# Patient Record
Sex: MALE | Race: OTHER | HISPANIC OR LATINO | URBAN - METROPOLITAN AREA
[De-identification: names, ages, dates, MRNs, and addresses within clinical notes are randomized per-mention and may not be internally consistent; named-entity substitution may affect disease eponyms.]

---

## 2024-08-09 ENCOUNTER — OUTPATIENT (OUTPATIENT)
Dept: OUTPATIENT SERVICES | Facility: HOSPITAL | Age: 24
LOS: 1 days | End: 2024-08-09
Payer: MEDICAID

## 2024-08-09 VITALS
SYSTOLIC BLOOD PRESSURE: 105 MMHG | TEMPERATURE: 98 F | RESPIRATION RATE: 16 BRPM | OXYGEN SATURATION: 98 % | WEIGHT: 214.95 LBS | HEART RATE: 67 BPM | HEIGHT: 67 IN | DIASTOLIC BLOOD PRESSURE: 66 MMHG

## 2024-08-09 DIAGNOSIS — S83.519A SPRAIN OF ANTERIOR CRUCIATE LIGAMENT OF UNSPECIFIED KNEE, INITIAL ENCOUNTER: ICD-10-CM

## 2024-08-09 DIAGNOSIS — S83.241A OTHER TEAR OF MEDIAL MENISCUS, CURRENT INJURY, RIGHT KNEE, INITIAL ENCOUNTER: ICD-10-CM

## 2024-08-09 DIAGNOSIS — M25.561 PAIN IN RIGHT KNEE: ICD-10-CM

## 2024-08-09 DIAGNOSIS — Z01.818 ENCOUNTER FOR OTHER PREPROCEDURAL EXAMINATION: ICD-10-CM

## 2024-08-09 DIAGNOSIS — S83.511A SPRAIN OF ANTERIOR CRUCIATE LIGAMENT OF RIGHT KNEE, INITIAL ENCOUNTER: ICD-10-CM

## 2024-08-09 LAB
ALBUMIN SERPL ELPH-MCNC: 3.8 G/DL — SIGNIFICANT CHANGE UP (ref 3.5–5)
ALP SERPL-CCNC: 68 U/L — SIGNIFICANT CHANGE UP (ref 40–120)
ALT FLD-CCNC: 38 U/L DA — SIGNIFICANT CHANGE UP (ref 10–60)
ANION GAP SERPL CALC-SCNC: 6 MMOL/L — SIGNIFICANT CHANGE UP (ref 5–17)
APTT BLD: 37.9 SEC — HIGH (ref 24.5–35.6)
AST SERPL-CCNC: 21 U/L — SIGNIFICANT CHANGE UP (ref 10–40)
BILIRUB SERPL-MCNC: 0.4 MG/DL — SIGNIFICANT CHANGE UP (ref 0.2–1.2)
BUN SERPL-MCNC: 19 MG/DL — HIGH (ref 7–18)
CALCIUM SERPL-MCNC: 9.2 MG/DL — SIGNIFICANT CHANGE UP (ref 8.4–10.5)
CHLORIDE SERPL-SCNC: 111 MMOL/L — HIGH (ref 96–108)
CO2 SERPL-SCNC: 23 MMOL/L — SIGNIFICANT CHANGE UP (ref 22–31)
CREAT SERPL-MCNC: 1.01 MG/DL — SIGNIFICANT CHANGE UP (ref 0.5–1.3)
EGFR: 107 ML/MIN/1.73M2 — SIGNIFICANT CHANGE UP
GLUCOSE SERPL-MCNC: 88 MG/DL — SIGNIFICANT CHANGE UP (ref 70–99)
HCT VFR BLD CALC: 45.9 % — SIGNIFICANT CHANGE UP (ref 39–50)
HGB BLD-MCNC: 15.2 G/DL — SIGNIFICANT CHANGE UP (ref 13–17)
INR BLD: 0.97 RATIO — SIGNIFICANT CHANGE UP (ref 0.85–1.18)
MCHC RBC-ENTMCNC: 29.1 PG — SIGNIFICANT CHANGE UP (ref 27–34)
MCHC RBC-ENTMCNC: 33.1 GM/DL — SIGNIFICANT CHANGE UP (ref 32–36)
MCV RBC AUTO: 87.8 FL — SIGNIFICANT CHANGE UP (ref 80–100)
NRBC # BLD: 0 /100 WBCS — SIGNIFICANT CHANGE UP (ref 0–0)
PLATELET # BLD AUTO: 309 K/UL — SIGNIFICANT CHANGE UP (ref 150–400)
POTASSIUM SERPL-MCNC: 4.2 MMOL/L — SIGNIFICANT CHANGE UP (ref 3.5–5.3)
POTASSIUM SERPL-SCNC: 4.2 MMOL/L — SIGNIFICANT CHANGE UP (ref 3.5–5.3)
PROT SERPL-MCNC: 7.8 G/DL — SIGNIFICANT CHANGE UP (ref 6–8.3)
PROTHROM AB SERPL-ACNC: 11.1 SEC — SIGNIFICANT CHANGE UP (ref 9.5–13)
RBC # BLD: 5.23 M/UL — SIGNIFICANT CHANGE UP (ref 4.2–5.8)
RBC # FLD: 13.2 % — SIGNIFICANT CHANGE UP (ref 10.3–14.5)
SODIUM SERPL-SCNC: 140 MMOL/L — SIGNIFICANT CHANGE UP (ref 135–145)
WBC # BLD: 6.71 K/UL — SIGNIFICANT CHANGE UP (ref 3.8–10.5)
WBC # FLD AUTO: 6.71 K/UL — SIGNIFICANT CHANGE UP (ref 3.8–10.5)

## 2024-08-09 RX ORDER — ACETAMINOPHEN 325 MG/1
975 TABLET ORAL ONCE
Refills: 0 | Status: COMPLETED | OUTPATIENT
Start: 2024-08-22 | End: 2024-08-22

## 2024-08-09 RX ORDER — SODIUM CHLORIDE 9 MG/ML
3 INJECTION INTRAMUSCULAR; INTRAVENOUS; SUBCUTANEOUS EVERY 8 HOURS
Refills: 0 | Status: DISCONTINUED | OUTPATIENT
Start: 2024-08-22 | End: 2024-08-23

## 2024-08-09 RX ORDER — CELECOXIB 400 MG/1
200 CAPSULE ORAL ONCE
Refills: 0 | Status: COMPLETED | OUTPATIENT
Start: 2024-08-22 | End: 2024-08-22

## 2024-08-09 NOTE — H&P PST ADULT - MUSCULOSKELETAL COMMENTS
Right Knee Arthroscopy with Anterior Cruciate Ligament Reconstruction and Quadriceps Tendon Autograft Possible Medial Meniscus Repair on 8/22/24 with Dr Mota

## 2024-08-09 NOTE — H&P PST ADULT - HISTORY OF PRESENT ILLNESS
24 y.o male with No PMHx, c/o of worsening Right knee pain, on w/u found to have Sprain anterior Cruciate Ligament Right Knee and now for schedule Right Knee Arthroscopy with Anterior Cruciate Ligament Reconstruction and Quadriceps Tendon Autograft Possible Medial Meniscus Repair on 8/22/24 with Dr Mota

## 2024-08-09 NOTE — H&P PST ADULT - REASON FOR ADMISSION
I ve been having pain for 2 yrs and didn't go to the doctor ,now I have an ACl injury and need surgery

## 2024-08-09 NOTE — H&P PST ADULT - MUSCULOSKELETAL
details… decreased ROM due to pain/normal gait/strength 5/5 bilateral upper extremities/strength 5/5 bilateral lower extremities

## 2024-08-09 NOTE — H&P PST ADULT - NSANTHOSAYNRD_GEN_A_CORE
No. NADIYA screening performed.  STOP BANG Legend: 0-2 = LOW Risk; 3-4 = INTERMEDIATE Risk; 5-8 = HIGH Risk

## 2024-08-09 NOTE — H&P PST ADULT - PROBLEM SELECTOR PLAN 1
Pt schedule for Right Knee Arthroscopy with Anterior Cruciate Ligament Reconstruction and Quadriceps Tendon Autograft Possible Medial Meniscus Repair on 8/22/24 with Dr Mota    Labs drawn by PST - will f/u result    Pt was  instructed to stop aspirin/ecotrin and all over the counter medication including vitamins and herbal supplements one week prior to surgery   Instructions given on the use of 4% chlorhexidine wash and Pt verbalized understanding of same   Pt Instructed to have nothing by mouth starting midnight day before surgery  Patient is to expect a phone call day before surgery between the hours of 430- 630pm giving arrival time for surgery   Written and verbal preoperative instructions given to patient with understanding verbalized.     Patient today with STOP bang score 2  Low  risk for NADIYA

## 2024-08-09 NOTE — H&P PST ADULT - ASSESSMENT
24 y.o male with Sprain anterior Cruciate Ligament Right Knee and now for schedule Right Knee Arthroscopy with Anterior Cruciate Ligament Reconstruction and Quadriceps Tendon Autograft Possible Medial Meniscus Repair on 8/22/24 with Dr Nakul YUSUF 2

## 2024-08-12 PROCEDURE — 85610 PROTHROMBIN TIME: CPT

## 2024-08-12 PROCEDURE — 85730 THROMBOPLASTIN TIME PARTIAL: CPT

## 2024-08-12 PROCEDURE — 36415 COLL VENOUS BLD VENIPUNCTURE: CPT

## 2024-08-12 PROCEDURE — 85027 COMPLETE CBC AUTOMATED: CPT

## 2024-08-12 PROCEDURE — G0463: CPT

## 2024-08-12 PROCEDURE — 80053 COMPREHEN METABOLIC PANEL: CPT

## 2024-08-22 ENCOUNTER — INPATIENT (INPATIENT)
Facility: HOSPITAL | Age: 24
LOS: 0 days | Discharge: HOME HEALTH | DRG: 556 | End: 2024-08-23
Attending: ORTHOPAEDIC SURGERY | Admitting: ORTHOPAEDIC SURGERY
Payer: MEDICAID

## 2024-08-22 VITALS
DIASTOLIC BLOOD PRESSURE: 68 MMHG | HEIGHT: 67 IN | SYSTOLIC BLOOD PRESSURE: 135 MMHG | RESPIRATION RATE: 16 BRPM | TEMPERATURE: 98 F | OXYGEN SATURATION: 98 % | WEIGHT: 214.95 LBS | HEART RATE: 59 BPM

## 2024-08-22 DIAGNOSIS — S83.511A SPRAIN OF ANTERIOR CRUCIATE LIGAMENT OF RIGHT KNEE, INITIAL ENCOUNTER: ICD-10-CM

## 2024-08-22 DIAGNOSIS — M25.561 PAIN IN RIGHT KNEE: ICD-10-CM

## 2024-08-22 DIAGNOSIS — S83.241A OTHER TEAR OF MEDIAL MENISCUS, CURRENT INJURY, RIGHT KNEE, INITIAL ENCOUNTER: ICD-10-CM

## 2024-08-22 DIAGNOSIS — Z01.818 ENCOUNTER FOR OTHER PREPROCEDURAL EXAMINATION: ICD-10-CM

## 2024-08-22 DEVICE — IMP FIBERTAG TGHTROPE W/ FLIPCUTTER III AND FIBER SNARE: Type: IMPLANTABLE DEVICE | Site: RIGHT | Status: FUNCTIONAL

## 2024-08-22 DEVICE — IMP ABS TIGHROPE ACL W/FIBERTAG: Type: IMPLANTABLE DEVICE | Site: RIGHT | Status: FUNCTIONAL

## 2024-08-22 DEVICE — IMP TIGHTROPE ABS BUTTON 8X12MM: Type: IMPLANTABLE DEVICE | Site: RIGHT | Status: FUNCTIONAL

## 2024-08-22 DEVICE — IMPLANTABLE DEVICE: Type: IMPLANTABLE DEVICE | Site: RIGHT | Status: FUNCTIONAL

## 2024-08-22 RX ORDER — KETOROLAC TROMETHAMINE 30 MG/ML
30 INJECTION, SOLUTION INTRAMUSCULAR EVERY 6 HOURS
Refills: 0 | Status: DISCONTINUED | OUTPATIENT
Start: 2024-08-22 | End: 2024-08-23

## 2024-08-22 RX ORDER — HYDROMORPHONE HYDROCHLORIDE 2 MG/1
0.5 TABLET ORAL
Refills: 0 | Status: DISCONTINUED | OUTPATIENT
Start: 2024-08-22 | End: 2024-08-22

## 2024-08-22 RX ORDER — ONDANSETRON 2 MG/ML
4 INJECTION, SOLUTION INTRAMUSCULAR; INTRAVENOUS EVERY 6 HOURS
Refills: 0 | Status: DISCONTINUED | OUTPATIENT
Start: 2024-08-22 | End: 2024-08-23

## 2024-08-22 RX ORDER — OXYCODONE AND ACETAMINOPHEN 7.5; 325 MG/1; MG/1
1 TABLET ORAL
Qty: 0 | Refills: 0 | DISCHARGE

## 2024-08-22 RX ORDER — ACETAMINOPHEN 325 MG/1
650 TABLET ORAL EVERY 6 HOURS
Refills: 0 | Status: DISCONTINUED | OUTPATIENT
Start: 2024-08-22 | End: 2024-08-23

## 2024-08-22 RX ORDER — ONDANSETRON 2 MG/ML
1 INJECTION, SOLUTION INTRAMUSCULAR; INTRAVENOUS
Qty: 0 | Refills: 0 | DISCHARGE

## 2024-08-22 RX ORDER — OXYCODONE HYDROCHLORIDE 5 MG/1
5 TABLET ORAL EVERY 6 HOURS
Refills: 0 | Status: DISCONTINUED | OUTPATIENT
Start: 2024-08-22 | End: 2024-08-23

## 2024-08-22 RX ORDER — SODIUM CHLORIDE 9 MG/ML
1000 INJECTION INTRAMUSCULAR; INTRAVENOUS; SUBCUTANEOUS
Refills: 0 | Status: DISCONTINUED | OUTPATIENT
Start: 2024-08-22 | End: 2024-08-23

## 2024-08-22 RX ORDER — ASPIRIN 81 MG
1 TABLET, DELAYED RELEASE (ENTERIC COATED) ORAL
Qty: 0 | Refills: 0 | DISCHARGE

## 2024-08-22 RX ADMIN — HYDROMORPHONE HYDROCHLORIDE 0.5 MILLIGRAM(S): 2 TABLET ORAL at 18:57

## 2024-08-22 RX ADMIN — ACETAMINOPHEN 975 MILLIGRAM(S): 325 TABLET ORAL at 10:20

## 2024-08-22 RX ADMIN — HYDROMORPHONE HYDROCHLORIDE 0.5 MILLIGRAM(S): 2 TABLET ORAL at 19:26

## 2024-08-22 RX ADMIN — HYDROMORPHONE HYDROCHLORIDE 0.5 MILLIGRAM(S): 2 TABLET ORAL at 19:09

## 2024-08-22 RX ADMIN — HYDROMORPHONE HYDROCHLORIDE 0.5 MILLIGRAM(S): 2 TABLET ORAL at 18:20

## 2024-08-22 RX ADMIN — SODIUM CHLORIDE 3 MILLILITER(S): 9 INJECTION INTRAMUSCULAR; INTRAVENOUS; SUBCUTANEOUS at 21:01

## 2024-08-22 RX ADMIN — CELECOXIB 200 MILLIGRAM(S): 400 CAPSULE ORAL at 10:21

## 2024-08-22 RX ADMIN — SODIUM CHLORIDE 140 MILLILITER(S): 9 INJECTION INTRAMUSCULAR; INTRAVENOUS; SUBCUTANEOUS at 21:02

## 2024-08-22 RX ADMIN — KETOROLAC TROMETHAMINE 30 MILLIGRAM(S): 30 INJECTION, SOLUTION INTRAMUSCULAR at 18:42

## 2024-08-22 RX ADMIN — ONDANSETRON 4 MILLIGRAM(S): 2 INJECTION, SOLUTION INTRAMUSCULAR; INTRAVENOUS at 23:31

## 2024-08-22 RX ADMIN — KETOROLAC TROMETHAMINE 30 MILLIGRAM(S): 30 INJECTION, SOLUTION INTRAMUSCULAR at 18:58

## 2024-08-22 NOTE — PROVIDER CONTACT NOTE (OTHER) - ASSESSMENT
pt able to mobilize toes and feel full sensation at RLE, palpable DP +2, cap refill adequate, pain 6/10 but reports he cannot tolerate the pain. Pt also still drowsy from anesthesia / pacu pain meds

## 2024-08-22 NOTE — ASU DISCHARGE PLAN (ADULT/PEDIATRIC) - NS MD DC FALL RISK RISK
For information on Fall & Injury Prevention, visit: [email or web address] OR  [email or web address] OR  https://www.cdc.gov/steadi/patient.html

## 2024-08-22 NOTE — ASU DISCHARGE PLAN (ADULT/PEDIATRIC) - CARE PROVIDER_API CALL
Clint Mota Mattoon  Orthopaedic Surgery  [address] [place] [id number]  Phone: [phone]  Fax: [phone]  Follow Up Time: 1 week

## 2024-08-22 NOTE — BRIEF OPERATIVE NOTE - NSICDXBRIEFPOSTOP_GEN_ALL_CORE_FT
POST-OP DIAGNOSIS:  Rupture of anterior cruciate ligament of right knee 22-Aug-2024 18:17:13  Lucas Stallings

## 2024-08-22 NOTE — ASU DISCHARGE PLAN (ADULT/PEDIATRIC) - ASU DC SPECIAL INSTRUCTIONSFT
keep brace on at all times  do not try to bend the knee  keep dressing clean and dry.   do not get wet.

## 2024-08-22 NOTE — BRIEF OPERATIVE NOTE - NSICDXBRIEFPREOP_GEN_ALL_CORE_FT
PRE-OP DIAGNOSIS:  Rupture of anterior cruciate ligament of right knee 22-Aug-2024 18:17:10  Lucas Stallings

## 2024-08-22 NOTE — PROVIDER CONTACT NOTE (OTHER) - SITUATION
s/p knee arthroscopy ACL Repair under [place] and right adductor canal block in OR, Physical Therapist cannot see patient after 1915 (Monalisa PT) and the patient states he is not ready to sit up.

## 2024-08-22 NOTE — BRIEF OPERATIVE NOTE - NSICDXBRIEFPROCEDURE_GEN_ALL_CORE_FT
PROCEDURES:  Arthroscopy of right knee with repair of anterior cruciate ligament using autograft 22-Aug-2024 18:16:48  Lucas Stallings

## 2024-08-22 NOTE — PACU DISCHARGE NOTE - THE ANESTHESIA ORDERS USED IN THE PACU ORDER SET WILL BE DISCONTINUED UPON TRANSFER OF THIS PATIENT
Statement Selected

## 2024-08-22 NOTE — PROVIDER CONTACT NOTE (OTHER) - RECOMMENDATIONS
MD Stallings spoke to MD Segovia. Patient unable to tolerate PT consult at this time, PT Francisco also agrees and spoke to MD Mota. Pt admitted for pain management and will see PT in the morning.

## 2024-08-22 NOTE — PATIENT PROFILE ADULT - FALL HARM RISK - UNIVERSAL INTERVENTIONS
Bed in lowest position, wheels locked, appropriate side rails in place/Call bell, personal items and telephone in reach/Instruct patient to call for assistance before getting out of bed or chair/Non-slip footwear when patient is out of bed/Rex to call system/Physically safe environment - no spills, clutter or unnecessary equipment/Purposeful Proactive Rounding/Room/bathroom lighting operational, light cord in reach

## 2024-08-22 NOTE — PATIENT PROFILE ADULT - FUNCTIONAL ASSESSMENT - BASIC MOBILITY SECTION LABEL
. [Arthralgia] : arthralgia [Joint Pain] : joint pain [Joint Stiffness] : joint stiffness [Joint Swelling] : joint swelling [Negative] : Heme/Lymph

## 2024-08-22 NOTE — PACU DISCHARGE NOTE - COMMENTS
Clear for discharge from pacu. No anesthetic complications Clear for discharge from pacu. No anesthetic complications  on NC 2LPM

## 2024-08-23 VITALS — HEART RATE: 85 BPM | DIASTOLIC BLOOD PRESSURE: 69 MMHG | SYSTOLIC BLOOD PRESSURE: 127 MMHG

## 2024-08-23 DIAGNOSIS — S83.511A SPRAIN OF ANTERIOR CRUCIATE LIGAMENT OF RIGHT KNEE, INITIAL ENCOUNTER: ICD-10-CM

## 2024-08-23 LAB
ANION GAP SERPL CALC-SCNC: 9 MMOL/L — SIGNIFICANT CHANGE UP (ref 5–17)
BASOPHILS # BLD AUTO: 0.01 K/UL — SIGNIFICANT CHANGE UP (ref 0–0.2)
BASOPHILS NFR BLD AUTO: 0.1 % — SIGNIFICANT CHANGE UP (ref 0–2)
BUN SERPL-MCNC: 12 MG/DL — SIGNIFICANT CHANGE UP (ref 7–18)
CALCIUM SERPL-MCNC: 8.7 MG/DL — SIGNIFICANT CHANGE UP (ref 8.4–10.5)
CHLORIDE SERPL-SCNC: 110 MMOL/L — HIGH (ref 96–108)
CO2 SERPL-SCNC: 24 MMOL/L — SIGNIFICANT CHANGE UP (ref 22–31)
CREAT SERPL-MCNC: 1.05 MG/DL — SIGNIFICANT CHANGE UP (ref 0.5–1.3)
EGFR: 102 ML/MIN/1.73M2 — SIGNIFICANT CHANGE UP
EOSINOPHIL # BLD AUTO: 0 K/UL — SIGNIFICANT CHANGE UP (ref 0–0.5)
EOSINOPHIL NFR BLD AUTO: 0 % — SIGNIFICANT CHANGE UP (ref 0–6)
GLUCOSE SERPL-MCNC: 119 MG/DL — HIGH (ref 70–99)
HCT VFR BLD CALC: 39.4 % — SIGNIFICANT CHANGE UP (ref 39–50)
HGB BLD-MCNC: 13.2 G/DL — SIGNIFICANT CHANGE UP (ref 13–17)
IMM GRANULOCYTES NFR BLD AUTO: 0.6 % — SIGNIFICANT CHANGE UP (ref 0–0.9)
LYMPHOCYTES # BLD AUTO: 1.43 K/UL — SIGNIFICANT CHANGE UP (ref 1–3.3)
LYMPHOCYTES # BLD AUTO: 9.9 % — LOW (ref 13–44)
MCHC RBC-ENTMCNC: 29.5 PG — SIGNIFICANT CHANGE UP (ref 27–34)
MCHC RBC-ENTMCNC: 33.5 GM/DL — SIGNIFICANT CHANGE UP (ref 32–36)
MCV RBC AUTO: 87.9 FL — SIGNIFICANT CHANGE UP (ref 80–100)
MONOCYTES # BLD AUTO: 1.2 K/UL — HIGH (ref 0–0.9)
MONOCYTES NFR BLD AUTO: 8.3 % — SIGNIFICANT CHANGE UP (ref 2–14)
NEUTROPHILS # BLD AUTO: 11.66 K/UL — HIGH (ref 1.8–7.4)
NEUTROPHILS NFR BLD AUTO: 81.1 % — HIGH (ref 43–77)
NRBC # BLD: 0 /100 WBCS — SIGNIFICANT CHANGE UP (ref 0–0)
PLATELET # BLD AUTO: 285 K/UL — SIGNIFICANT CHANGE UP (ref 150–400)
POTASSIUM SERPL-MCNC: 4.2 MMOL/L — SIGNIFICANT CHANGE UP (ref 3.5–5.3)
POTASSIUM SERPL-SCNC: 4.2 MMOL/L — SIGNIFICANT CHANGE UP (ref 3.5–5.3)
RBC # BLD: 4.48 M/UL — SIGNIFICANT CHANGE UP (ref 4.2–5.8)
RBC # FLD: 13.4 % — SIGNIFICANT CHANGE UP (ref 10.3–14.5)
SODIUM SERPL-SCNC: 143 MMOL/L — SIGNIFICANT CHANGE UP (ref 135–145)
WBC # BLD: 14.39 K/UL — HIGH (ref 3.8–10.5)
WBC # FLD AUTO: 14.39 K/UL — HIGH (ref 3.8–10.5)

## 2024-08-23 PROCEDURE — 36415 COLL VENOUS BLD VENIPUNCTURE: CPT

## 2024-08-23 PROCEDURE — 97161 PT EVAL LOW COMPLEX 20 MIN: CPT

## 2024-08-23 PROCEDURE — C1776: CPT

## 2024-08-23 PROCEDURE — 80048 BASIC METABOLIC PNL TOTAL CA: CPT

## 2024-08-23 PROCEDURE — C1889: CPT

## 2024-08-23 PROCEDURE — C1713: CPT

## 2024-08-23 PROCEDURE — 85025 COMPLETE CBC W/AUTO DIFF WBC: CPT

## 2024-08-23 RX ORDER — SENNA 187 MG
1 TABLET ORAL
Qty: 14 | Refills: 0
Start: 2024-08-23 | End: 2024-08-29

## 2024-08-23 RX ADMIN — KETOROLAC TROMETHAMINE 30 MILLIGRAM(S): 30 INJECTION, SOLUTION INTRAMUSCULAR at 01:05

## 2024-08-23 RX ADMIN — SODIUM CHLORIDE 3 MILLILITER(S): 9 INJECTION INTRAMUSCULAR; INTRAVENOUS; SUBCUTANEOUS at 05:07

## 2024-08-23 RX ADMIN — KETOROLAC TROMETHAMINE 30 MILLIGRAM(S): 30 INJECTION, SOLUTION INTRAMUSCULAR at 00:05

## 2024-08-23 NOTE — DISCHARGE NOTE NURSING/CASE MANAGEMENT/SOCIAL WORK - PATIENT PORTAL LINK FT
You can access the FollowMyHealth Patient Portal offered by [facility] by registering at the following website: [email or web address]. By joining [employer]’s FollowMyHealth portal, you will also be able to view your health information using other applications (apps) compatible with our system.

## 2024-08-23 NOTE — DISCHARGE NOTE NURSING/CASE MANAGEMENT/SOCIAL WORK - NSDCPEFALRISK_GEN_ALL_CORE
For information on Fall & Injury Prevention, visit: [email or web address] OR  [email or web address] OR  https://www.cdc.gov/steadi/patient.html

## 2024-08-23 NOTE — PHYSICAL THERAPY INITIAL EVALUATION ADULT - NSPTDISCHREC_GEN_A_CORE
pending Ortho follow up; Home in care of the mother/Outpatient PT pending Ortho follow up; Home with family car and in care of the mother/Outpatient PT

## 2024-08-23 NOTE — DISCHARGE NOTE PROVIDER - NSDCCPCAREPLAN_GEN_ALL_CORE_FT
PRINCIPAL DISCHARGE DIAGNOSIS  Diagnosis: Right ACL tear  Assessment and Plan of Treatment: Pain Management- See Attached Medication Reconciliation  Weight Bearing Status: WBAT to RLE with appropriate assistive device  Equipment needs: Commode, Walker  Dressing: Please keep bandage/dressing Clean, Dry, and Intact.  Dvt prophylaxis: D/C on Aspirin 325mg once a day for 28 day   PT/Occupational Therapy are Activities of Daily Living as appropriate  Follow up with Orthopedic Surgeon Dr. Clint Mota

## 2024-08-23 NOTE — DISCHARGE NOTE PROVIDER - HOSPITAL COURSE
Pt is a 25 y/o M who underwent elective Right knee arthroscopic ACL reconstruction on 8/22/24 . No complications. Pt received daily Physical therapy and Deep vein thrombosis prophylaxis postoperatively. Stable for discharge home.

## 2024-08-23 NOTE — DISCHARGE NOTE PROVIDER - NSDCMRMEDTOKEN_GEN_ALL_CORE_FT
aspirin 325 mg oral capsule: 1 cap(s) orally once a day  Percocet 5 mg-325 mg oral tablet: 1 tab(s) orally every 6 hours as needed for  severe pain  Senna 8.6 mg oral tablet: 1 tab(s) orally every 12 hours as needed for  constipation MDD: 2  Zofran 8 mg oral tablet: 1 tab(s) orally every 8 hours as needed for  nausea

## 2024-08-23 NOTE — DISCHARGE NOTE PROVIDER - NSDCCPTREATMENT_GEN_ALL_CORE_FT
PRINCIPAL PROCEDURE  Procedure: Arthroscopy of right knee with repair of anterior cruciate ligament using autograft  Findings and Treatment: S/p Right knee arthroscopic ACL reconstruction   If patient has drainage from the wound, please contact the surgeon's office.   If patient has intractable pain, please contact the surgeon's office.   If excessively warm at the incision site is noted, please contact the surgeon's office.   If redness is noted, especially spreading, please contact the surgeon's office.   If patient has fever over 101F please return to the hospital through the Emergency Room.   If dinesh blood is saturating the dressing, please return to the hospital through the Emergency Room.  If drainage of fluid or pus is noted, please return to the hospital through the Emergency Room.

## 2024-08-23 NOTE — DISCHARGE NOTE PROVIDER - CARE PROVIDER_API CALL
Clint Mota Hyde  Orthopaedic Surgery  [address] [place] [id number]  Phone: [phone]  Fax: [phone]  Follow Up Time: 1 week

## 2024-08-23 NOTE — PHYSICAL THERAPY INITIAL EVALUATION ADULT - RANGE OF MOTION EXAMINATION, REHAB EVAL
normal appearance , trachea midline , Thyroid normal size
except rle immobilized/no ROM deficits were identified

## 2024-08-23 NOTE — PHYSICAL THERAPY INITIAL EVALUATION ADULT - GENERAL OBSERVATIONS, REHAB EVAL
male amsug patient converted, premedicated RN, mother at bedside, s/p R knee surgery/immobilizer, pt ambulatory with level and incline surface with assistance of crutches male amsurg patient converted, premedicated RN, mother at bedside, s/p R knee surgery/immobilizer, pt ambulatory with level and incline surface with assistance of crutches

## 2025-04-04 NOTE — PROGRESS NOTE ADULT - SUBJECTIVE AND OBJECTIVE BOX
OFFICE VISIT      Patient: Mary Cardoza Date of Service: 2025   : 1934 MRN: [id number]         SUBJECTIVE:     Chief Complaint   Patient presents with    Hospital F/U     [facility],  seen for severe sinus infection       Mary Cardoza is a 89 year old female who presents today for  see below    HISTORY OF PRESENT ILLNESS:   HPI    Presents  for   PA home NIK 3/27/25  Rx sinus infection  Echo EFG  60%  Here w daughter  Rx levaquin 500mgcompleted  Dyazide  and levothyroxine same  Plan assisted living [facility] [place]  [facility] [place]  Shower chair    Cardiology eval ok  ECHO  EF 60%  Dermatology consultGI recommended colonoscopy  she declined    ADLs  Needs help w showers  Transportation  Ok feeding  Needs help w meal prep and shopping  She ambulates w walker  Needs assist transfers bed to chair  She has electric scooter  No bowel or bladder incintinence  Needs raised toliet seat  Daughter state she is a hoarder    Daughter is POA  HC  She is full code          PAST MEDICAL HISTORY:  Past Medical History:   Diagnosis Date    Deep venous thrombosis of distal leg  (CMD)     1963 post partum    History of allergic rhinitis     History of epistaxis     History of urinary frequency     History of urticaria     Scoliosis     Stye     2017 bilateral July and September       MEDICATIONS:  Current Outpatient Medications   Medication Sig    Synthroid 50 MCG tablet TAKE 1 TABLET EVERY DAY    fluticasone (FLONASE) 50 MCG/ACT nasal spray USE 1 SPRAY INTO EACH NOSTRIL 1 TIME DAILY    triamterene-hydroCHLOROthiazide (MAXZIDE-25) 37.5-25 MG per tablet TAKE 1 TABLET EVERY DAY    azithromycin (ZITHROMAX) 250 MG tablet TAKE 2 TABLETS BY MOUTH ON DAY 1, THEN 1 TABLET DAILY ON DAYS 2-5    DENOSumab (Prolia) 60 MG/ML Solution Prefilled Syringe Inject 1 syringe into the skin one time for one dose.    fluticasone (FLONASE) 50 MCG/ACT nasal spray USE 1 SPRAY INTO EACH NOSTRIL 1 TIME DAILY    meclizine (ANTIVERT)  25 MG tablet Take 1 tablet by mouth 3 times daily as needed for Dizziness.    triamterene-hydroCHLOROthiazide (MAXZIDE-25) 37.5-25 MG per tablet Take 1 tablet by mouth daily.    Cholecalciferol (VITAMIN D-3) 5000 units Tab Take 5,000 Units by mouth daily.    fluticasone (FLONASE) 50 MCG/ACT nasal spray Spray 1 spray in each nostril daily.    triamterene-hydroCHLOROthiazide (MAXZIDE-25) 37.5-25 MG per tablet Take 1 tablet by mouth daily.     No current facility-administered medications for this visit.       ALLERGIES:  ALLERGIES:   Allergen Reactions    Sulfur HIVES    Bactrim Ds Other (See Comments)    Clindamycin Other (See Comments)    Penicillins Other (See Comments)       PAST SURGICAL HISTORY:  Past Surgical History:   Procedure Laterality Date    Appendectomy      Cataract extraction, bilateral      Foot surgery      Hysterectomy      Sinus surgery         FAMILY HISTORY:  Family History   Problem Relation Age of Onset    Hypertension Other     Sepsis Mother     Kidney disease Mother     Dementia/Alzheimers Mother     Heart disease Father     Cancer Daughter     Cancer, Colon Daughter     Sepsis Maternal Grandfather     Heart disease Maternal Grandfather        SOCIAL HISTORY:  Social History     Tobacco Use    Smoking status: Never     Passive exposure: Never    Smokeless tobacco: Never   Vaping Use    Vaping status: never used   Substance Use Topics    Alcohol use: No    Drug use: Never       Review of Systems   Constitutional:  Negative for activity change, appetite change, chills, diaphoresis, fatigue, fever and unexpected weight change.   HENT:  Negative for congestion, dental problem, trouble swallowing and voice change.    Eyes:  Negative for photophobia and visual disturbance.   Respiratory:  Negative for apnea, cough, chest tightness and shortness of breath.    Cardiovascular:  Negative for chest pain, palpitations and leg swelling.   Gastrointestinal:  Positive for constipation. Negative for  Orth[facility] Surgery     [facility]    Diagnosis: S/p Right knee arthroscopic ACL reconstruction POD#1    24hr interval:  Patient was seen and evaluated at bedside. Patient with no acute complaints.   Pain is mild; localized to the RLE over the quad harvesting site and well controlled medications. Patient awaiting to ambulate with physical therapy.     Denies CP/SOB, dyspnea, paresthesias, N/V/D, palpitations.     Vital Signs Last 24 Hrs  T(C): 37.1 (23 Aug 2024 04:50), Max: 37.1 (23 Aug 2024 04:50)  T(F): 98.8 (23 Aug 2024 04:50), Max: 98.8 (23 Aug 2024 04:50)  HR: 100 (23 Aug 2024 04:50) (59 - 115)  BP: 130/69 (23 Aug 2024 04:50) (106/61 - 135/68)  BP(mean): 84 (22 Aug 2024 19:25) (72 - 85)  RR: 18 (23 Aug 2024 04:50) (13 - 19)  SpO2: 95% (23 Aug 2024 04:50) (94% - 100%)    Parameters below as of 23 Aug 2024 04:50  Patient On (Oxygen Delivery Method): room air      I&O's Detail    22 Aug 2024 07:01  -  23 Aug 2024 07:00  --------------------------------------------------------  IN:    Lactated Ringers Bolus: 1600 mL    Oral Fluid: 100 mL  Total IN: 1700 mL    OUT:    Voided (mL): 1200 mL  Total OUT: 1200 mL    Total NET: 500 mL          Physical Exam:    General: AAOx3, NAD, resting comfortably in bed.  MSK:  Right knee: Knee locked in extension with alexander brace intact. Dressing is C/D/I.   Skin is pink and warm.    No erythema.   No wound drainage.   Lower extremities:  No calf tenderness, calves are soft.   2+pulses. NVI. 5/5 Strength of EHL/TA/gastrocnemius B/L.    Good capillary refill. SILT.                          13.2   14.39 )-----------( 285      ( 23 Aug 2024 05:45 )             39.4     08-23    143  |  110<H>  |  12  ----------------------------<  119<H>  4.2   |  24  |  1.05    Ca    8.7      23 Aug 2024 05:45        Impression:  24yMale S/p Right knee arthroscopic ACL reconstruction POD#1  Plan:  -  Pain management  -  Dvt prophylaxis with SCD's  -  Daily Physical Therapy: WBAT of the right lower extremity with appropriate assistive device and knee locked in extension in alexander brace  -  Discharge planning: Home today   -  Continue with Post-op Antibiotics x 24hrs  -  Encouraged use of incentive spirometry  -  Case d/w Dr. Clint Mota    abdominal distention, abdominal pain, anal bleeding, blood in stool, diarrhea, nausea, rectal pain and vomiting.   Endocrine: Negative for cold intolerance and heat intolerance.   Genitourinary:  Negative for difficulty urinating, dysuria, frequency and urgency.   Musculoskeletal:  Positive for arthralgias, back pain and gait problem. Negative for joint swelling and neck pain.        Knee and LBP   Skin:  Negative for color change, rash and wound.   Allergic/Immunologic: Negative for environmental allergies and food allergies.   Neurological:  Negative for dizziness, tremors, syncope, weakness, light-headedness, numbness and headaches.   Hematological:  Negative for adenopathy. Does not bruise/bleed easily.   Psychiatric/Behavioral:  Negative for agitation, behavioral problems, confusion, decreased concentration, dysphoric mood, sleep disturbance and suicidal ideas. The patient is nervous/anxious.        OBJECTIVE:     Visit Vitals  BP (!) 141/71   Pulse 92   Temp 98.2 °F (36.8 °C)       Physical Exam  Vitals reviewed.   Constitutional:       General: She is not in acute distress.     Appearance: She is well-developed. She is not diaphoretic.   HENT:      Head: Normocephalic and atraumatic.      Right Ear: External ear normal.      Left Ear: External ear normal.      Mouth/Throat:      Pharynx: No oropharyngeal exudate.   Eyes:      General: No scleral icterus.     Conjunctiva/sclera: Conjunctivae normal.      Pupils: Pupils are equal, round, and reactive to light.   Cardiovascular:      Rate and Rhythm: Normal rate and regular rhythm.      Heart sounds: No murmur heard.  Pulmonary:      Effort: Pulmonary effort is normal.      Breath sounds: Normal breath sounds.   Chest:      Chest wall: No tenderness.   Abdominal:      General: Bowel sounds are normal. There is no distension.      Palpations: Abdomen is soft. There is no mass.      Tenderness: There is no abdominal tenderness.   Musculoskeletal:         General:  Deformity present. Normal range of motion.      Cervical back: Normal range of motion and neck supple.      Comments: Djd hands and knees   Lymphadenopathy:      Cervical: No cervical adenopathy.   Skin:     General: Skin is warm and dry.   Neurological:      Mental Status: She is alert and oriented to person, place, and time.      Cranial Nerves: No cranial nerve deficit.      Motor: No abnormal muscle tone.      Coordination: Coordination normal.      Gait: Gait abnormal.      Deep Tendon Reflexes: Reflexes are normal and symmetric.      Comments: She is I wheelchair  Needs assist w tranfers   Psychiatric:         Behavior: Behavior normal.         Thought Content: Thought content normal.         Judgment: Judgment normal.         DIAGNOSTIC STUDIES:   LAB RESULTS:    No visits with results within 1 Year(s) from this visit.   Latest known visit with results is:   Lab Services on 03/11/2024   Component Date Value Ref Range Status    Fasting Status 03/11/2024 0  0 - 999 Hours Final    Sodium 03/11/2024 139  135 - 145 mmol/L Final    Potassium 03/11/2024 4.1  3.4 - 5.1 mmol/L Final    Chloride 03/11/2024 104  97 - 110 mmol/L Final    Carbon Dioxide 03/11/2024 27  21 - 32 mmol/L Final    Anion Gap 03/11/2024 12  7 - 19 mmol/L Final    Glucose 03/11/2024 83  70 - 99 mg/dL Final    BUN 03/11/2024 17  6 - 20 mg/dL Final    Creatinine 03/11/2024 0.79  0.51 - 0.95 mg/dL Final    Glomerular Filtration Rate 03/11/2024 72  >=60 Final    BUN/Cr 03/11/2024 22  7 - 25 Final    Calcium 03/11/2024 10.0  8.4 - 10.2 mg/dL Final    Bilirubin, Total 03/11/2024 0.6  0.2 - 1.0 mg/dL Final    GOT/AST 03/11/2024 19  <=37 Units/L Final    GPT/ALT 03/11/2024 18  <64 Units/L Final    Alkaline Phosphatase 03/11/2024 106  45 - 117 Units/L Final    Albumin 03/11/2024 4.3  3.6 - 5.1 g/dL Final    Protein, Total 03/11/2024 7.7  6.4 - 8.2 g/dL Final    Globulin 03/11/2024 3.4  2.0 - 4.0 g/dL Final    A/G Ratio 03/11/2024 1.3  1.0 - 2.4 Final     Cholesterol 03/11/2024 239 (H)  <=199 mg/dL Final    Triglycerides 03/11/2024 185 (H)  <=149 mg/dL Final    HDL 03/11/2024 55  >=50 mg/dL Final    LDL 03/11/2024 147 (H)  <=129 mg/dL Final    Non-HDL Cholesterol 03/11/2024 184  mg/dL Final    Cholesterol/ HDL Ratio 03/11/2024 4.3  <=4.4 Final    TSH 03/11/2024 1.272  0.350 - 5.000 mcUnits/mL Final    Vitamin D, 25-Hydroxy 03/11/2024 35.9  30.0 - 100.0 ng/mL Final    WBC 03/11/2024 6.9  4.2 - 11.0 K/mcL Final    RBC 03/11/2024 5.22 (H)  4.00 - 5.20 mil/mcL Final    HGB 03/11/2024 15.9 (H)  12.0 - 15.5 g/dL Final    HCT 03/11/2024 48.8 (H)  36.0 - 46.5 % Final    MCV 03/11/2024 93.5  78.0 - 100.0 fl Final    MCH 03/11/2024 30.5  26.0 - 34.0 pg Final    MCHC 03/11/2024 32.6  32.0 - 36.5 g/dL Final    RDW-CV 03/11/2024 14.6  11.0 - 15.0 % Final    RDW-SD 03/11/2024 50.2 (H)  39.0 - 50.0 fL Final    PLT 03/11/2024 333  140 - 450 K/mcL Final    NRBC 03/11/2024 0  <=0 /100 WBC Final    Neutrophil, Percent 03/11/2024 63  % Final    Lymphocytes, Percent 03/11/2024 25  % Final    Mono, Percent 03/11/2024 10  % Final    Eosinophils, Percent 03/11/2024 0  % Final    Basophils, Percent 03/11/2024 2  % Final    Immature Granulocytes 03/11/2024 0  % Final    Absolute Neutrophils 03/11/2024 4.4  1.8 - 7.7 K/mcL Final    Absolute Lymphocytes 03/11/2024 1.8  1.0 - 4.0 K/mcL Final    Absolute Monocytes 03/11/2024 0.7  0.3 - 0.9 K/mcL Final    Absolute Eosinophils  03/11/2024 0.0  0.0 - 0.5 K/mcL Final    Absolute Basophils 03/11/2024 0.1  0.0 - 0.3 K/mcL Final    Absolute Immature Granulocytes 03/11/2024 0.0  0.0 - 0.2 K/mcL Final       ASSESSMENT AND PLAN:   This is a 89 year old year-old female who presents with:    Problem List Items Addressed This Visit          Cardiac and Vasculature    Benign essential hypertension     Stable  meds same  Low salt  Walk as tolerated         Tortuous aorta (CMD)     Monitor: The problem is unchanged.  Evaluation: No labs/tests required  today.  Assessment/Treatment:  Continue current treatment/monitoring regimen.            Gastrointestinal and Abdominal    Diarrhea     Stable  Declined colonoscopy            Musculoskeletal and Injuries    Primary osteoarthritis involving multiple joints - Primary     Ortho prn  Ambulate w walker/sccoter  Needs assist transfers  Hospital bed  Raised toiulet seat  Shower chair  Rec supportive leaving low income         Relevant Orders    SERVICE TO HOME CARE DME       Symptoms and Signs    Impaired mobility and ADLs     Walker or motorized scooter  Shower cahir  Electric bed  Raised toilet seat            PLEASE TAKE MEDICATIONS AS DIRECTED.      Instructions provided as documented in the AVS.    Return in about 2 months (around 6/4/2025) for wellness exam  30 minutes.      The patient indicated understanding of the diagnosis and agreed with the plan of care.

## (undated) DEVICE — SOL IRR LR 3000ML

## (undated) DEVICE — SHAVER BLADE S&N INCISOR PLUS PLATINUM 4.5MM

## (undated) DEVICE — VENODYNE/SCD SLEEVE CALF MEDIUM

## (undated) DEVICE — DRAPE 1/2 SHEET 40X57"

## (undated) DEVICE — SUT POLYSORB 2-0 30" GS-10 UNDYED

## (undated) DEVICE — TUBING DEPUY MITEK FMS INFLOW

## (undated) DEVICE — PACK KNEE ARTHROSCOPY

## (undated) DEVICE — FOR-TOURNIQUET 1200909933: Type: DURABLE MEDICAL EQUIPMENT

## (undated) DEVICE — DRSG COBAN 6"

## (undated) DEVICE — LAP PAD W RING 18 X 18"

## (undated) DEVICE — ARTHREX MULTIFIRE SCORPION NEEDLE

## (undated) DEVICE — DRSG KLING 4"

## (undated) DEVICE — FOR-ESU VALLEYLAB T7E15009DX: Type: DURABLE MEDICAL EQUIPMENT

## (undated) DEVICE — BUR S&N ELITE ABRADER 5.5MM STRAIGHT (BLACK)

## (undated) DEVICE — DRSG COMBINE 5X9"

## (undated) DEVICE — DRSG XEROFORM 5 X 9"

## (undated) DEVICE — TUBING DEPUY MITEK FMS OUTFLOW

## (undated) DEVICE — DRSG WEBRIL 6"

## (undated) DEVICE — DRSG CURITY GAUZE SPONGE 4 X 4" 12-PLY

## (undated) DEVICE — TOURNIQUET ESMARK 6"

## (undated) DEVICE — SUT VICRYL 2-0 27" CT-2 UNDYED

## (undated) DEVICE — BRACE KNEE T SCOPE PREMIER POST OP

## (undated) DEVICE — SUT VICRYL 1 27" OS-8 UNDYED

## (undated) DEVICE — DRSG STERISTRIPS 0.5 X 4"

## (undated) DEVICE — DRSG MASTISOL

## (undated) DEVICE — NDL SPINAL 18G X 3.5" (PINK)

## (undated) DEVICE — SUT TIGERSTICK TIGERWIRE NUMBER 2

## (undated) DEVICE — WARMING BLANKET UPPER ADULT

## (undated) DEVICE — HARVESTER QUADPRO 11MM